# Patient Record
Sex: MALE | Race: WHITE | NOT HISPANIC OR LATINO | Employment: FULL TIME | URBAN - METROPOLITAN AREA
[De-identification: names, ages, dates, MRNs, and addresses within clinical notes are randomized per-mention and may not be internally consistent; named-entity substitution may affect disease eponyms.]

---

## 2024-06-21 ENCOUNTER — HOSPITAL ENCOUNTER (OUTPATIENT)
Dept: RADIOLOGY | Facility: HOSPITAL | Age: 34
Discharge: HOME | End: 2024-06-21
Attending: STUDENT IN AN ORGANIZED HEALTH CARE EDUCATION/TRAINING PROGRAM
Payer: COMMERCIAL

## 2024-06-21 ENCOUNTER — OFFICE VISIT (OUTPATIENT)
Dept: ORTHOPEDICS | Facility: CLINIC | Age: 34
End: 2024-06-21
Payer: COMMERCIAL

## 2024-06-21 ENCOUNTER — OFFICE VISIT (OUTPATIENT)
Dept: FAMILY MEDICINE | Facility: CLINIC | Age: 34
End: 2024-06-21
Payer: COMMERCIAL

## 2024-06-21 VITALS
BODY MASS INDEX: 38.66 KG/M2 | OXYGEN SATURATION: 97 % | HEART RATE: 66 BPM | WEIGHT: 246.31 LBS | DIASTOLIC BLOOD PRESSURE: 86 MMHG | SYSTOLIC BLOOD PRESSURE: 124 MMHG | HEIGHT: 67 IN

## 2024-06-21 DIAGNOSIS — S46.311A STRAIN OF RIGHT TRICEPS, INITIAL ENCOUNTER: ICD-10-CM

## 2024-06-21 DIAGNOSIS — M25.511 CHRONIC RIGHT SHOULDER PAIN: ICD-10-CM

## 2024-06-21 DIAGNOSIS — G89.29 CHRONIC RIGHT SHOULDER PAIN: ICD-10-CM

## 2024-06-21 DIAGNOSIS — E66.2 CLASS 2 OBESITY WITH ALVEOLAR HYPOVENTILATION, SERIOUS COMORBIDITY, AND BODY MASS INDEX (BMI) OF 38.0 TO 38.9 IN ADULT: ICD-10-CM

## 2024-06-21 DIAGNOSIS — M25.511 RIGHT SHOULDER PAIN, UNSPECIFIED CHRONICITY: ICD-10-CM

## 2024-06-21 DIAGNOSIS — G56.01 CARPAL TUNNEL SYNDROME OF RIGHT WRIST: Primary | ICD-10-CM

## 2024-06-21 DIAGNOSIS — Z00.00 ENCOUNTER FOR MEDICAL EXAMINATION TO ESTABLISH CARE: Primary | ICD-10-CM

## 2024-06-21 DIAGNOSIS — Z79.899 ENCOUNTER FOR LONG-TERM (CURRENT) USE OF MEDICATIONS: ICD-10-CM

## 2024-06-21 DIAGNOSIS — M77.11 LATERAL EPICONDYLITIS OF RIGHT ELBOW: ICD-10-CM

## 2024-06-21 DIAGNOSIS — K64.9 HEMORRHOIDS, UNSPECIFIED HEMORRHOID TYPE: ICD-10-CM

## 2024-06-21 DIAGNOSIS — M25.531 RIGHT WRIST PAIN: ICD-10-CM

## 2024-06-21 DIAGNOSIS — Z11.59 NEED FOR HEPATITIS C SCREENING TEST: ICD-10-CM

## 2024-06-21 DIAGNOSIS — Z23 NEED FOR VACCINATION: ICD-10-CM

## 2024-06-21 DIAGNOSIS — M25.511 RIGHT SHOULDER PAIN, UNSPECIFIED CHRONICITY: Primary | ICD-10-CM

## 2024-06-21 DIAGNOSIS — M25.521 RIGHT ELBOW PAIN: ICD-10-CM

## 2024-06-21 DIAGNOSIS — M67.911 TENDINOPATHY OF RIGHT ROTATOR CUFF: ICD-10-CM

## 2024-06-21 DIAGNOSIS — Z11.4 SCREENING FOR HIV (HUMAN IMMUNODEFICIENCY VIRUS): ICD-10-CM

## 2024-06-21 DIAGNOSIS — Z13.220 LIPID SCREENING: ICD-10-CM

## 2024-06-21 PROCEDURE — 99999 PR PBB SHADOW E&M-EST. PATIENT-LVL III: CPT | Mod: PBBFAC,,, | Performed by: STUDENT IN AN ORGANIZED HEALTH CARE EDUCATION/TRAINING PROGRAM

## 2024-06-21 PROCEDURE — 73030 X-RAY EXAM OF SHOULDER: CPT | Mod: TC,PO,RT

## 2024-06-21 PROCEDURE — 73030 X-RAY EXAM OF SHOULDER: CPT | Mod: 26,RT,, | Performed by: STUDENT IN AN ORGANIZED HEALTH CARE EDUCATION/TRAINING PROGRAM

## 2024-06-21 PROCEDURE — 99999 PR PBB SHADOW E&M-NEW PATIENT-LVL V: CPT | Mod: PBBFAC,,, | Performed by: FAMILY MEDICINE

## 2024-06-21 RX ORDER — METHYLPREDNISOLONE 4 MG/1
TABLET ORAL
Qty: 21 TABLET | Refills: 0 | Status: SHIPPED | OUTPATIENT
Start: 2024-06-21

## 2024-06-21 RX ORDER — DOCUSATE SODIUM 100 MG/1
100 CAPSULE, LIQUID FILLED ORAL 2 TIMES DAILY PRN
Qty: 30 CAPSULE | Refills: 3 | Status: SHIPPED | OUTPATIENT
Start: 2024-06-21

## 2024-06-21 NOTE — PROGRESS NOTES
Patient ID: Abdoul Bess  YOB: 1990  MRN: [id number]    Chief Complaint: Pain of the Right Elbow      Referred By:Dr. Shipley    History of Present Illness: Abdoul Bess is a right-hand dominant 34 y.o. male who presents today with right shoulder pain. The patient experienced tendinitis in his right elbow, which resolved for 3 years but has since recurred. He manages the pain with over-the-counter ibuprofen, taking 4 tablets every few days, but reports minimal relief. The pain radiates into his right shoulder, specifically behind the shoulder blade.  He reports numbness in his fingertips,      The patient is active in none.  Occupation:        Past Medical History:   History reviewed. No pertinent past medical history.  Past Surgical History:   Procedure Laterality Date    APPENDECTOMY  2019    ear reconstruction Left      Family History   Problem Relation Name Age of Onset    Diabetes Mother Nemo- 41yo at passing     Brain cancer Mother Nemo- 41yo at passing      Social History     Socioeconomic History    Marital status:    Tobacco Use    Smoking status: Never     Passive exposure: Never    Smokeless tobacco: Never   Substance and Sexual Activity    Alcohol use: Yes     Comment: Weekends sometimes    Drug use: Never    Sexual activity: Yes     Partners: Female     Social Determinants of Health     Financial Resource Strain: Low Risk  (6/19/2024)    Overall Financial Resource Strain (CARDIA)     Difficulty of Paying Living Expenses: Not very hard   Food Insecurity: No Food Insecurity (6/19/2024)    Hunger Vital Sign     Worried About Running Out of Food in the Last Year: Never true     Ran Out of Food in the Last Year: Never true   Physical Activity: Unknown (6/19/2024)    Exercise Vital Sign     Days of Exercise per Week: 2 days   Stress: No Stress Concern Present (6/19/2024)    [employer] - Occupational Stress Questionnaire     Feeling  of Stress : Only a little   Housing Stability: Unknown (6/19/2024)    Housing Stability Vital Sign     Unable to Pay for Housing in the Last Year: No     Medication List with Changes/Refills   Current Medications    DOCUSATE SODIUM (COLACE) 100 MG CAPSULE    Take 1 capsule (100 mg total) by mouth 2 (two) times daily as needed for Constipation.    METHYLPREDNISOLONE (MEDROL DOSEPACK) 4 MG TABLET    follow package directions     Review of patient's allergies indicates:  No Known Allergies    Physical Exam:   There is no height or weight on file to calculate BMI.    GENERAL: Well appearing, in no acute distress.  HEAD: Normocephalic and atraumatic.  ENT: External ears and nose grossly normal.  EYES: EOMI bilaterally  PULMONARY: Respirations are grossly even and non-labored.  NEURO: Awake, alert, and oriented x 3.  SKIN: No obvious rashes appreciated.  PSYCH: Mood & affect are appropriate.    Detailed MSK exam:     Right shoulder exam:   -ROM: abduction 130, forward flexion 130, external rotation 90, internal rotation 80  -empty can test pain but no weakness, resisted ER negative, belly press negative  -duong test negative, neers test negative, whipple test negative  -biceps load test negative, yerguson test negative, Seanor's test negative  -sensation intact, pulses 2+  -TTP: none    Right elbow exam:   -TTP: Lateral epicondyle and Distal triceps insertion  -ROM: extension 0, flexion 130  -Resisted wrist extension: positive  -Resisted 3rd finger extension: negative  -Resisted wrist flexion: negative  -Resisted pronation: negative  -Resisted supination: negative  -Sensation intact  -Pulses 2+  - strength 5/5    Right hand/wrist exam:   -TTP: none  -Swelling/ecchymosis: none  -Full ROM  - strength 5/5  -Sensation intact  -Pulses 2+  -Rotational deformity: negative  -Tinel sign: positive  -Phalen sign: positive  -Finkelstein sign: negative        Imaging:  X-ray Shoulder 2 or More Views Right  Narrative: EXAM:  XR SHOULDER COMPLETE 2 OR MORE VIEWS RIGHT    CLINICAL HISTORY:  [M25.511]-Pain in right shoulder.    TECHNIQUE: Frontal, scapular Y, axial, and Grashey views of the right shoulder    COMPARISON: None available.    FINDINGS:  There is no acute fracture or dislocation. Bone mineralization is normal. No aggressive lytic or blastic lesion. No osseous erosion or aggressive periosteal reaction seen.  No os acromiale.  No significant subacromial bone spur.  Joint spaces are preserved with normal alignment. Soft tissues are unremarkable.  Impression: Unremarkable radiographic appearance of the right shoulder.    Finalized on: 6/21/2024 1:43 PM By:  Vinay Coy MD  [facility]# [id number]      2024-06-21 13:45:[id number]    [facility]        Relevant imaging results were reviewed and interpreted by me and per my read shows no acute abnormalities.  This was discussed with the patient and / or family today.     Assessment:  Abdoul Bess is a 34 y.o. male presenting with right arm pain.   History, physical and radiographs are consistent with a likely diagnosis of RC tendinopathy, triceps strain, mild lateral epicondylitis, carpal tunnel syndrome.   Plan: wrist brace given. Consider PT referral. Consider steroid injection if not improving. Continue conservative management for pain.   Follow up as needed. All questions answered.      Carpal tunnel syndrome of right wrist  -     HME - OTHER    Right elbow pain  -     Ambulatory referral/consult to Orthopedics    Chronic right shoulder pain  -     Ambulatory referral/consult to Orthopedics    Right wrist pain  -     Ambulatory referral/consult to Orthopedics    Lateral epicondylitis of right elbow    Strain of right triceps, initial encounter    Tendinopathy of right rotator cuff         At least 15 minutes were spent sizing, fitting, and educating for durable medical equipment application today. This service was performed under direction of Daren Maya MD. CPT 50164.     A copy of  today's visit note has been sent to the referring provider.     Electronically signed:  Daren Maya MD, MPH  06/21/2024  2:20 PM

## 2024-06-21 NOTE — PATIENT INSTRUCTIONS
Follow up in about 1 year (around 6/21/2025), or if symptoms worsen or fail to improve, for Annual Wellness Exam.     Dear patient,   As a result of recent federal legislation (The Federal Cures Act), you may receive lab or pathology results from your visit in your [facility] account before your physician is able to contact you. Your physician or their representative will relay the results to you with their recommendations at their soonest availability.     If no improvement in symptoms or symptoms worsen, please be advised to call MD, follow-up at clinic and/or go to ER if becomes severe.    Jaret Shipley M.D.        We Offer TELEHEALTH & Same Day Appointments!   Book your Telehealth appointment with me through my nurse or   Clinic appointments on [facility]!    [address] [place] [address]    Office: [phone]   FAX: [phone]    Check out my Facebook Page and Follow Me at: [email or web address]    Check out my website at [facility] by clicking on: [email or web address]    To Schedule appointments online, go to [facility]: [email or web address]

## 2024-06-21 NOTE — PROGRESS NOTES
This note is specifically for wellness visit performed today.   WELLNESS EXAM    Patient ID: Abdoul Bess is a 34 y.o. male.  has no past medical history on file.   Chief Complaint:  Encounter for wellness exam    Well Adult Physical: Patient here for a comprehensive physical exam.The patient reports chronic problems.  New patient.  Patient had no previous PCP.  History of Present Illness  The patient is a 34-year-old male here to establish care.    The patient has not previously consulted with a healthcare provider and is seeking to establish care. He is fasting today. 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 He received a Tdap vaccine following a finger injury a few years ago.    The patient experienced tendinitis in his right elbow, which resolved for 3 years but has since recurred. He manages the pain with over-the-counter ibuprofen, taking 4 tablets every few days, but reports minimal relief. The pain radiates into his right shoulder, specifically behind the shoulder blade. He has previously taken steroids for illness management. A previous consultation with a specialist suggested an injection for his elbow, which he declined. He reports numbness in his fingertips, suspecting carpal tunnel syndrome, and occasionally experiences numbness in his left hand.    The patient suspects a hemorrhoid, which has been present for an extended period. He reports occasional bright red blood on the toilet paper after using the restroom, but denies any associated pain. He has not used suppositories. He typically has 3 bowel movements per day, but occasionally experiences constipation. He spends a significant amount of time sitting during his work hours. He denies any abdominal pain, nausea, or vomiting.    The patient reports difficulty losing weight. 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 He has maintained his weight for an extended period, but has noticed a slight increase. His fluid intake primarily consists of water, and he occasionally consumes soda.   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 He denies smoking.   He is not aware of any family history of colon cancer, breast cancer, prostate cancer, or any type of cancer. His mother had diabetes and passed away when he was 16 from brain cancer at the age of 40.   The patient has no known medication allergies.     Do you take any herbs or supplements that were not prescribed by a doctor? no   Are you taking calcium supplements? no      History: UROLOGIST:   Date last PSA: No results found for: "PSA" no family history of prostate cancer reported  No results found for: "TESTOSTERONE" No results found for: "TESTOSTERONE", "TOTALTESTOST", "BIOTESTO"   Colon cancer screening:  No family history of colon cancer reported  Health Maintenance Topics with due status: Not Due       Topic Last Completion Date    TETANUS VACCINE 06/21/2024    Influenza Vaccine Not Due      ==============================================  History reviewed.   Health Maintenance Due   Topic Date Due    Hepatitis C Screening  Never done    Lipid Panel  Never done    HIV Screening  Never done       Past Medical History:  History reviewed. No pertinent past medical history.  Past Surgical History:   Procedure Laterality Date    APPENDECTOMY  2019    ear reconstruction Left      Review of patient's allergies indicates:  No Known Allergies  No current outpatient medications on file prior to visit.     No current facility-administered medications on file prior to visit.     Social History     Socioeconomic History    Marital status:    Tobacco Use    Smoking status: Never     Passive exposure: Never    Smokeless tobacco: Never   Substance and Sexual Activity    Alcohol use: Yes     " Comment: Weekends sometimes    Drug use: Never    Sexual activity: Yes     Partners: Female     Social Determinants of Health     Financial Resource Strain: Low Risk  (6/19/2024)    Overall Financial Resource Strain (CARDIA)     Difficulty of Paying Living Expenses: Not very hard   Food Insecurity: No Food Insecurity (6/19/2024)    Hunger Vital Sign     Worried About Running Out of Food in the Last Year: Never true     Ran Out of Food in the Last Year: Never true   Physical Activity: Unknown (6/19/2024)    Exercise Vital Sign     Days of Exercise per Week: 2 days   Stress: No Stress Concern Present (6/19/2024)    [employer] - Occupational Stress Questionnaire     Feeling of Stress : Only a little   Housing Stability: Unknown (6/19/2024)    Housing Stability Vital Sign     Unable to Pay for Housing in the Last Year: No     Family History   Problem Relation Name Age of Onset    Diabetes Mother Nemo- 41yo at passing     Brain cancer Mother Nemo- 41yo at passing        Review of Systems   Constitutional:  Positive for unexpected weight change (Gaining weight). Negative for chills, fatigue and fever.   HENT:  Negative for ear pain and sore throat.    Eyes:  Negative for redness and visual disturbance.   Respiratory:  Negative for cough and shortness of breath.    Cardiovascular:  Negative for chest pain and palpitations.   Gastrointestinal:  Negative for nausea and vomiting.   Endocrine: Negative for cold intolerance and heat intolerance.   Genitourinary:  Negative for difficulty urinating and hematuria.   Musculoskeletal:  Positive for arthralgias and myalgias.   Skin:  Negative for rash and wound.   Allergic/Immunologic: Negative for environmental allergies and food allergies.   Neurological:  Negative for weakness and headaches.   Hematological:  Negative for adenopathy. Does not bruise/bleed easily.   Psychiatric/Behavioral:  Negative for sleep disturbance. The patient is not  nervous/anxious.         Objective:     Vitals:    06/21/24 0807   BP: 124/86   Pulse: 66    Body mass index is 38.58 kg/m².  Physical Exam  Vitals and nursing note reviewed.   Constitutional:       General: He is not in acute distress.     Appearance: He is well-developed. He is obese. He is not diaphoretic.   HENT:      Head: Normocephalic and atraumatic.      Right Ear: External ear normal.      Left Ear: External ear normal.      Nose: Nose normal. No rhinorrhea.   Eyes:      Extraocular Movements: Extraocular movements intact.      Pupils: Pupils are equal, round, and reactive to light.   Cardiovascular:      Rate and Rhythm: Normal rate.      Pulses: Normal pulses.   Pulmonary:      Effort: Pulmonary effort is normal. No respiratory distress.      Breath sounds: Normal breath sounds.   Abdominal:      General: Bowel sounds are normal.      Palpations: Abdomen is soft.   Musculoskeletal:         General: Tenderness present. No swelling, deformity or signs of injury. Normal range of motion.      Cervical back: Normal range of motion and neck supple.   Skin:     General: Skin is warm and dry.      Capillary Refill: Capillary refill takes less than 2 seconds.      Findings: No rash.   Neurological:      General: No focal deficit present.      Mental Status: He is alert and oriented to person, place, and time.      Cranial Nerves: No cranial nerve deficit.      Motor: No weakness.      Gait: Gait normal.   Psychiatric:         Attention and Perception: He is attentive.         Mood and Affect: Mood normal. Mood is not anxious or depressed. Affect is not labile, blunt, angry or inappropriate.         Speech: He is communicative. Speech is not rapid and pressured, delayed, slurred or tangential.         Behavior: Behavior normal. Behavior is not agitated, slowed, aggressive, withdrawn, hyperactive or combative.         Thought Content: Thought content normal. Thought content is not paranoid or delusional. Thought  content does not include homicidal or suicidal ideation. Thought content does not include homicidal or suicidal plan.         Cognition and Memory: Memory is not impaired.         Judgment: Judgment normal. Judgment is not impulsive or inappropriate.       deferred by the patient, FABRICE deferred    No results found for any previous visit.        Assessment / Plan:    1.  Routine health exam-patient here for annual wellness exam.  Labs ordered.  Health maintenance was reviewed and ordered.  Anticipatory guidance: Don't smoke.  Healthy diet and regular exercise recommended. Vaccine recommendations discussed.  See orders.  Reviewed Anticipatory guidance, risk factor reduction interventions or counseling as needed, Complete history , physical was completed today.  Complete and thorough medication reconciliation was performed.  Discussed risks and benefits of medications.  Advised patient on orders and health maintenance.  We discussed old records and old labs if available.  Will request any records not available through epic.  Continue current medications listed on your summary sheet.  All questions were answered. Patient had no further concerns. Advised of diagnoses and plan. Follow up as planned or return sooner if symptoms persist or worsen.   Assessment & Plan  1. Health maintenance.  The patient's blood pressure readings are within the normal range. A comprehensive blood panel will be conducted to assess for anemia, renal and hepatic functions, thyroid function, diabetes screening, lipid profile, hepatitis C, and HIV screening. The patient will also receive a Tdap vaccine today.  Discussed risk and benefits of Tdap.    2. Tendinitis in the right elbow.  A referral to sports medicine will be made. A Medrol Dosepak will be prescribed.    3. Hemorrhoids.  The condition is likely a hemorrhoid. The patient is advised to use over-the-counter Preparation H. Docusate will be prescribed, to be taken once daily. Should the  condition worsen, a scope may be necessary. If docusate fails to alleviate the issue, a referral to a colorectal specialist will be considered.  Please be advised constipation condition course.  I recommend increase daily water intake to an acceptable level.  Increase fiber.  Recommend stool softener and laxative if needed.  Goal of 1 bowel movement daily.  Follow-up to clinic or notify me if no improvement or symptoms are persistent or worsening.  ER precautions were given.  Recommend daily exercise as tolerated, adequate water intake (six 8-oz glasses of water daily), and high fiber diet. OTC fiber supplements are recommended if diet does not reach daily fiber goal (20-30 grams daily), such as Metamucil, Citrucel, or FiberCon (take as directed, separate from other oral medications by >2 hours).  - CONTINUE OTC stool softener such as Colace as directed to avoid hard stools and straining with bowel movements PRN  -If still no improvement with these measures, call/follow-up      4. Weight management.  The patient's BMI is 38. The patient is advised to reduce his BMI to the low 30s. A caloric restriction of 500 calories per day over a week is recommended, with a total daily intake of 3500 calories. A daily exercise of 30 minutes for 5 days a week for 150 minutes is recommended.    Orders Placed This Encounter   Procedures    Lipid Panel     Standing Status:   Future     Number of Occurrences:   1     Standing Expiration Date:   8/20/2025    Hepatitis C Antibody     Standing Status:   Future     Number of Occurrences:   1     Standing Expiration Date:   8/20/2025    HIV 1/2 Ag/Ab (4th Gen)     Standing Status:   Future     Number of Occurrences:   1     Standing Expiration Date:   8/20/2025    Hemoglobin A1C     Standing Status:   Future     Number of Occurrences:   1     Standing Expiration Date:   8/20/2025    CBC Without Differential     Standing Status:   Future     Number of Occurrences:   1     Standing  Expiration Date:   8/20/2025    Comprehensive Metabolic Panel     Standing Status:   Future     Number of Occurrences:   1     Standing Expiration Date:   8/20/2025    TSH     Standing Status:   Future     Number of Occurrences:   1     Standing Expiration Date:   8/20/2025    Ambulatory referral/consult to Orthopedics     Standing Status:   Future     Standing Expiration Date:   7/21/2025     Referral Priority:   Routine     Referral Type:   Consultation     Referred to Provider:   Daren Maya MD     Requested Specialty:   Orthopedic Surgery     Number of Visits Requested:   1    Ambulatory referral/consult to Colorectal Surgery     Standing Status:   Future     Standing Expiration Date:   7/21/2025     Referral Priority:   Routine     Referral Type:   Consultation     Referral Reason:   Specialty Services Required     Requested Specialty:   Colon and Rectal Surgery     Number of Visits Requested:   1       Medications Ordered This Encounter   Medications    docusate sodium (COLACE) 100 MG capsule     Sig: Take 1 capsule (100 mg total) by mouth 2 (two) times daily as needed for Constipation.     Dispense:  30 capsule     Refill:  3    methylPREDNISolone (MEDROL DOSEPACK) 4 mg tablet     Sig: follow package directions     Dispense:  21 tablet     Refill:  0    VFC-Tdap (BOOSTRIX) vaccine 0.5 mL      Future Appointments       Date Provider Specialty Appt Notes    6/21/2024 Daren Maya MD Orthopedics .    7/31/2024 Madeline Rodgers MD Colon and Rectal Surgery hemroids           Jaret Shipley MD

## 2024-06-21 NOTE — PATIENT INSTRUCTIONS
Assessment:  Abdoul Bess is a 34 y.o. male No chief complaint on file.      Encounter Diagnoses   Name Primary?    Right elbow pain     Chronic right shoulder pain     Right wrist pain     Lateral epicondylitis of right elbow Yes    Strain of right triceps, initial encounter     Tendinopathy of right rotator cuff         Plan:  Fitted and issued right wrist brace for carpal tunnel syndrome  Under the direction of Dr. Daren Maya, 15 minutes were spent sizing, fitting, and educating for durable medical equipment application today.  CPT 92802.  Apply topical diclofenac (Voltaren) up to 4 times a day to the affected area.  It can be bought over the counter at any local pharmacy.    Follow up as needed          Follow-up: as needed.    Thank you for choosing [facility] and Dr. Daren Maya for your orthopedic & sports medicine care. It is our goal to provide you with exceptional care that will help keep you healthy, active, and get you back in the game.    Please do not hesitate to reach out to us via email, phone, or MyChart with any questions, concerns, or feedback.    If you felt that you received exemplary care today, please consider leaving us feedback on [email or web address] at:  [email or web address]    If you are experiencing pain/discomfort ,or have questions after 5pm and would like to be connected to the [facility] on-call team, please call this number and specify which Sports Medicine provider is treating you: [phone]

## 2024-07-11 ENCOUNTER — OFFICE VISIT (OUTPATIENT)
Dept: SURGERY | Facility: CLINIC | Age: 34
End: 2024-07-11
Payer: COMMERCIAL

## 2024-07-11 VITALS
HEART RATE: 92 BPM | DIASTOLIC BLOOD PRESSURE: 72 MMHG | BODY MASS INDEX: 37.92 KG/M2 | SYSTOLIC BLOOD PRESSURE: 107 MMHG | HEIGHT: 67 IN | TEMPERATURE: 98 F | OXYGEN SATURATION: 96 % | WEIGHT: 241.63 LBS

## 2024-07-11 DIAGNOSIS — K59.00 CONSTIPATION, UNSPECIFIED CONSTIPATION TYPE: Primary | ICD-10-CM

## 2024-07-11 DIAGNOSIS — K64.9 HEMORRHOIDS, UNSPECIFIED HEMORRHOID TYPE: ICD-10-CM

## 2024-07-11 PROCEDURE — 46221 LIGATION OF HEMORRHOID(S): CPT | Mod: S$GLB,,, | Performed by: STUDENT IN AN ORGANIZED HEALTH CARE EDUCATION/TRAINING PROGRAM

## 2024-07-11 PROCEDURE — 99204 OFFICE O/P NEW MOD 45 MIN: CPT | Mod: 25,S$GLB,, | Performed by: STUDENT IN AN ORGANIZED HEALTH CARE EDUCATION/TRAINING PROGRAM

## 2024-07-11 PROCEDURE — 99999 PR PBB SHADOW E&M-EST. PATIENT-LVL III: CPT | Mod: PBBFAC,,, | Performed by: STUDENT IN AN ORGANIZED HEALTH CARE EDUCATION/TRAINING PROGRAM

## 2024-07-11 NOTE — PROGRESS NOTES
[facility] Office Visit    SUBJECTIVE:     Chief Complaint: rectal bleeding    History of Present Illness:  Patient is a 34 y.o. male presents with complaints of bright red blood per rectum.  He reports this has been ongoing for several years.  It was bright red blood on the toilet after bowel movements.  He reports his bowel movements are frequently constipated.  He passes hard stool in his associated with significant straining and prolonged amount of time on the toilet.  He does not use powder fiber supplement.  He does not have adequate water intake.  He was recently trying to make changes for healthier lifestyle including exercise and weight loss with a healthier high-fiber diet. Denies FH of colorectal cancer, polyps or IBD      Colonoscopy:  None    Pathology:  None    Review of patient's allergies indicates:  No Known Allergies    No past medical history on file.  Past Surgical History:   Procedure Laterality Date    APPENDECTOMY  2019    ear reconstruction Left      Family History   Problem Relation Name Age of Onset    Diabetes Mother Nemo- 41yo at passing     Brain cancer Mother Nemo- 41yo at passing      Social History     Tobacco Use    Smoking status: Never     Passive exposure: Never    Smokeless tobacco: Never   Substance Use Topics    Alcohol use: Yes     Comment: Weekends sometimes    Drug use: Never            OBJECTIVE:     Vital Signs (Most Recent)  Temp: 98 °F (36.7 °C) (07/11/24 1554)  Pulse: 92 (07/11/24 1554)  BP: 107/72 (07/11/24 1554)  SpO2: 96 % (07/11/24 1554)    Physical Exam:  Physical Exam  Constitutional:       Appearance: He is well-developed.   HENT:      Head: Normocephalic and atraumatic.   Eyes:      Conjunctiva/sclera: Conjunctivae normal.      Pupils: Pupils are equal, round, and reactive to light.   Neck:      Thyroid: No thyromegaly.   Cardiovascular:      Rate and Rhythm: Normal rate and regular rhythm.   Pulmonary:      Effort: Pulmonary effort is normal. No respiratory  distress.   Abdominal:      General: There is no distension.      Palpations: Abdomen is soft. There is no mass.      Tenderness: There is no abdominal tenderness.   Musculoskeletal:         General: No tenderness. Normal range of motion.      Cervical back: Normal range of motion.   Skin:     General: Skin is warm and dry.      Capillary Refill: Capillary refill takes less than 2 seconds.   Neurological:      General: No focal deficit present.      Mental Status: He is alert and oriented to person, place, and time.         Anoscopy Procedure Note    Pre-procedure diagnosis: Rectal bleeding    Post-procedure diagnosis: Internal hemorrhoids    Procedure: Anoscopy    Surgeon: Madeline Rodgers MD    Assistant: CANELO Bustamante MA      Findings:   External anal exam was within normal limits.  A digital rectal exam was performed with no masses identified and no defects.  There was appropriate squeeze of the sphincter complex as well as relaxation of the puborectalis with push.    A lubricated anoscope was inserted in the anus and the anus inspected circumferentially with findings of grade 2 internal hemorrhoids in LL and RA with recent stigmata of bleeding.  RP grade 2 internal hemorrhoid without bleeding    Using a suction hemorrhoid , hemorrhoidal band ligation was performed on the LL and RA columns. A well placed band was visualized at the base of the hemorrhoid.    Patient tolerated procedure well.  Discharge home.     No follow-ups on file.    ASSESSMENT/PLAN:     Diagnoses and all orders for this visit:    Constipation, unspecified constipation type    Hemorrhoids, unspecified hemorrhoid type  -     Ambulatory referral/consult to Colorectal Surgery      - given bright red blood per rectum I recommend a colonoscopy.  Patient is reluctant to pursue colonoscopy at this time.  Discussed that without a colonoscopy I would be unable to rule out a more proximal source of bleeding.  - agreed to banding today.  But  educated that if bleeding does not improve we should get a colonoscopy  - Discussed that a minimum I would recommend behavioral, lifestyle and medication modifications to improve bowel habits. Usual bowel management handout given to patient. This includes a stool softener twice per day, fiber powder supplementation daily, drinking at least 64oz of water/day, avoiding straining with bowel movements, spending less than 5 min on toilet per bowel movement, eating a high fiber diet, using miralax as needed to achieve a bowel movement daily and using wet wipes to wipe after bowel movements when irritated.   - usual banding handout given and all questions answered

## 2024-09-11 ENCOUNTER — PATIENT MESSAGE (OUTPATIENT)
Dept: FAMILY MEDICINE | Facility: CLINIC | Age: 34
End: 2024-09-11
Payer: COMMERCIAL